# Patient Record
Sex: MALE | Race: WHITE | NOT HISPANIC OR LATINO | Employment: STUDENT | URBAN - METROPOLITAN AREA
[De-identification: names, ages, dates, MRNs, and addresses within clinical notes are randomized per-mention and may not be internally consistent; named-entity substitution may affect disease eponyms.]

---
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
## 2020-07-01 ENCOUNTER — TELEMEDICINE CONVERTED (OUTPATIENT)
Dept: GASTROENTEROLOGY | Facility: CLINIC | Age: 20
End: 2020-07-01
Attending: PHYSICIAN ASSISTANT

## 2020-08-13 ENCOUNTER — OFFICE VISIT CONVERTED (OUTPATIENT)
Dept: GASTROENTEROLOGY | Facility: CLINIC | Age: 20
End: 2020-08-13
Attending: NURSE PRACTITIONER

## 2020-08-13 ENCOUNTER — HOSPITAL ENCOUNTER (OUTPATIENT)
Dept: LAB | Facility: HOSPITAL | Age: 20
Discharge: HOME | End: 2020-08-13
Attending: NURSE PRACTITIONER

## 2020-08-13 LAB
ALBUMIN SERPL-MCNC: 4.7 G/DL (ref 3.5–5)
ALBUMIN/GLOB SERPL: 1.7 {RATIO} (ref 1.4–2.6)
ALP SERPL-CCNC: 94 U/L (ref 53–128)
ALT SERPL-CCNC: 11 U/L (ref 10–40)
ANION GAP SERPL CALC-SCNC: 23 MMOL/L (ref 8–19)
AST SERPL-CCNC: 14 U/L (ref 15–50)
BASOPHILS # BLD AUTO: 0.03 10*3/UL (ref 0–0.2)
BASOPHILS NFR BLD AUTO: 0.4 % (ref 0–3)
BILIRUB SERPL-MCNC: 0.47 MG/DL (ref 0.2–1.3)
BUN SERPL-MCNC: 10 MG/DL (ref 5–25)
BUN/CREAT SERPL: 9 {RATIO} (ref 6–20)
CALCIUM SERPL-MCNC: 10.7 MG/DL (ref 8.7–10.4)
CHLORIDE SERPL-SCNC: 101 MMOL/L (ref 99–111)
CONV ABS IMM GRAN: 0.02 10*3/UL (ref 0–0.2)
CONV CO2: 24 MMOL/L (ref 22–32)
CONV IMMATURE GRAN: 0.3 % (ref 0–1.8)
CONV TOTAL PROTEIN: 7.4 G/DL (ref 6.3–8.2)
CREAT UR-MCNC: 1.1 MG/DL (ref 0.7–1.2)
DEPRECATED RDW RBC AUTO: 35.8 FL (ref 35.1–43.9)
EOSINOPHIL # BLD AUTO: 0 % (ref 0–7)
EOSINOPHIL # BLD AUTO: 0 10*3/UL (ref 0–0.7)
ERYTHROCYTE [DISTWIDTH] IN BLOOD BY AUTOMATED COUNT: 11.2 % (ref 11.6–14.4)
GFR SERPLBLD BASED ON 1.73 SQ M-ARVRAT: >60 ML/MIN/{1.73_M2}
GLOBULIN UR ELPH-MCNC: 2.7 G/DL (ref 2–3.5)
GLUCOSE SERPL-MCNC: 85 MG/DL (ref 70–99)
HCT VFR BLD AUTO: 46.7 % (ref 42–52)
HGB BLD-MCNC: 16.6 G/DL (ref 14–18)
LYMPHOCYTES # BLD AUTO: 2.19 10*3/UL (ref 1–5)
LYMPHOCYTES NFR BLD AUTO: 32.7 % (ref 20–45)
MCH RBC QN AUTO: 31.3 PG (ref 27–31)
MCHC RBC AUTO-ENTMCNC: 35.5 G/DL (ref 33–37)
MCV RBC AUTO: 88.1 FL (ref 80–96)
MONOCYTES # BLD AUTO: 0.46 10*3/UL (ref 0.2–1.2)
MONOCYTES NFR BLD AUTO: 6.9 % (ref 3–10)
NEUTROPHILS # BLD AUTO: 4 10*3/UL (ref 2–8)
NEUTROPHILS NFR BLD AUTO: 59.7 % (ref 30–85)
NRBC CBCN: 0 % (ref 0–0.7)
OSMOLALITY SERPL CALC.SUM OF ELEC: 296 MOSM/KG (ref 273–304)
PLATELET # BLD AUTO: 234 10*3/UL (ref 130–400)
PMV BLD AUTO: 10.8 FL (ref 9.4–12.4)
POTASSIUM SERPL-SCNC: 4.1 MMOL/L (ref 3.5–5.3)
RBC # BLD AUTO: 5.3 10*6/UL (ref 4.7–6.1)
SODIUM SERPL-SCNC: 144 MMOL/L (ref 135–147)
WBC # BLD AUTO: 6.7 10*3/UL (ref 4.8–10.8)

## 2020-08-22 ENCOUNTER — HOSPITAL ENCOUNTER (OUTPATIENT)
Dept: PREADMISSION TESTING | Facility: HOSPITAL | Age: 20
Discharge: HOME | End: 2020-08-22
Attending: INTERNAL MEDICINE

## 2020-08-22 LAB — SARS-COV-2 RNA SPEC QL NAA+PROBE: NOT DETECTED

## 2020-08-27 ENCOUNTER — HOSPITAL ENCOUNTER (OUTPATIENT)
Dept: GASTROENTEROLOGY | Facility: HOSPITAL | Age: 20
Setting detail: HOSPITAL OUTPATIENT SURGERY
Discharge: HOME | End: 2020-08-27
Attending: INTERNAL MEDICINE

## 2020-11-30 ENCOUNTER — TELEMEDICINE CONVERTED (OUTPATIENT)
Dept: GASTROENTEROLOGY | Facility: CLINIC | Age: 20
End: 2020-11-30
Attending: NURSE PRACTITIONER

## 2021-05-13 NOTE — PROGRESS NOTES
Progress Note      Patient Name: Junaid Raman   Patient ID: [id number]   Sex: Male   YOB: 2000    Referring Provider: Colleen Valle MD    Visit Date: November 30, 2020    Provider: DELILAH Weathers   Location: [facility] [place]   Location Address: [address] [place] [address]  [place]  [id number]   Location Phone: [phone]          Chief Complaint  · Follow up of EGD/Colonoscopy      History Of Present Illness  Video Conferencing Visit  Junaid Raman is a 19 year old /White male who is presenting for evaluation via video conferencing via [employer]. Verbal consent obtained before beginning visit.   The following staff were present during this visit: Ana Warren MA      19-year-old male returns for follow-up after having EGD/colonoscopy 08/2020 by Dr. Vigil for epigastric pain, GERD, vomiting, generalized abdominal pain, and change in bowel habits.  The procedures revealed medium hiatal hernia with a gaping lower esophageal sphincter noted, grade 1 esophagitis, and normal mucosa in the whole colon.  Random colon biopsy was unremarkable.  His omeprazole was decreased from 40 mg to 20 mg after the procedure.  His reflux did improve, but it has come back. He reports that he had C. diff in high school, which was attributed to taking a PPI.   He does report having nausea after eating, but he does not vomit.  He has abdominal pain every few days, for which is controlled with dicyclomine as needed.  He reports he has 1 loose stool a day.         Past Medical History  Chronic Ear Infection         Past Surgical History  Colonoscopy; EGD         Medication List  citalopram 10 mg oral tablet; dicyclomine 20 mg oral tablet; Florastor 250 mg oral capsule; fluoxetine 20 mg oral capsule; omeprazole 40 mg oral capsule,delayed release(DR/EC)         Allergy List  NO KNOWN DRUG ALLERGIES       Allergies Reconciled  Family Medical History  - No Family History of Colorectal  Cancer         Social History  Alcohol (Never); Tobacco (Never)         Review of Systems  · Constitutional  o Denies  o : chills, fever  · Cardiovascular  o Denies  o : chest pain  · Respiratory  o Denies  o : shortness of breath  · Gastrointestinal  o Admits  o : nausea  o Denies  o : vomiting, dysphagia  · Endocrine  o Denies  o : weight gain, weight loss      Physical Examination  · Constitutional  o Appearance  o : Healthy-appearing, awake and alert in no acute distress  · Head and Face  o Head  o : Normocephalic with no worriesome skin lesions  · Eyes  o Vision  o :   § Visual Fields  § : eyes move symmetrical in all directions  o Sclerae  o : sclerae anicteric  · Neck  o Inspection/Palpation  o : Trachea is midline, no adenopathy  · Respiratory  o Respiratory Effort  o : Breathing is unlabored.  o Inspection of Chest  o : normal appearance          Assessment  · Esophageal Reflux     530.81/K21.9  · Hernia, Hiatal     553.3/K44.9  · Irritable Bowel Syndrome     564.1/K58.9  · Nausea     787.02/R11.0      Plan  · Medications  o famotidine 20 mg oral tablet   SIG: take 1 tablet (20 mg) by oral route once daily at bedtime for 30 days   DISP: (30) Tablet with 3 refills  Prescribed on 11/30/2020     o Medications have been Reconciled  o Transition of Care or Provider Policy  · Instructions  o 19-year-old male agrees to a telehealth visit. We have discussed the results of his EGD/colonoscopy from August 2020. In addition to his omeprazole 20 mg daily, I would to send in famotidine 20 mg nightly to see if that will help improve his symptoms. I have also discussed following a gastroparesis diet, as it may help improve his nausea after eating. I am going to have him follow-up in 3 months.            Electronically Signed by: DELILAH Weathers -Author on November 30, 2020 01:30:41 PM  Electronically Co-signed by: Daniel Vigil MD -Reviewer on December 16, 2020 05:28:17 PM

## 2021-05-13 NOTE — PROGRESS NOTES
Progress Note      Patient Name: Junaid Raman   Patient ID: [id number]   Sex: Male   YOB: 2000    Referring Provider: Colleen Valle MD    Visit Date: July 1, 2020    Provider: Reuben New PA-C   Location: [facility]   Location Address: [address] [place] [address]  [id number]   Location Phone: [phone]          History Of Present Illness  TELEHEALTH TELEPHONE VISIT  Chief Complaint: Bentyl refill   Junaid Raman is a 19 year old /White male who is presenting for evaluation via telehealth telephone visit. Verbal consent obtained before beginning visit.   Provider spent 11 minutes with the patient during the telehealth visit.   The following staff were present during this visit: Marylin Warren MA   Past Medical History/ Overview of Patient Symptoms      Past Medical History  Chronic Ear Infection         Past Surgical History  *No Pertinent Past Medical History         Medication List  dicyclomine 10 mg oral capsule; dicyclomine 20 mg oral tablet; Florastor 250 mg oral capsule; fluoxetine 20 mg oral capsule; omeprazole 40 mg oral capsule,delayed release(DR/EC)         Allergy List  NO KNOWN DRUG ALLERGIES       Allergies Reconciled  Family Medical History  - No Family History of Colorectal Cancer         Social History  *Denies Alcohol Use; Alcohol (Never); Tobacco (Never)         Review of Systems  · Constitutional  o Denies  o : fatigue, night sweats  · Eyes  o Denies  o : double vision, blurred vision  · HENT  o Denies  o : vertigo, recent head injury  · Breasts  o Denies  o : abnormal changes in breast size, additional breast symptoms except as noted in the HPI  · Cardiovascular  o Denies  o : chest pain, irregular heart beats  · Respiratory  o Denies  o : shortness of breath, productive cough  · Gastrointestinal  o Denies  o : nausea, vomiting  · Genitourinary  o Denies  o : dysuria, urinary retention  · Integument  o Denies  o : hair growth change, new skin  lesions  · Neurologic  o Denies  o : altered mental status, seizures  · Musculoskeletal  o Denies  o : joint swelling, limitation of motion  · Endocrine  o Denies  o : cold intolerance, heat intolerance  · Heme-Lymph  o Denies  o : petechiae, lymph node enlargement or tenderness  · Allergic-Immunologic  o Denies  o : frequent illnesses      Physical Examination     Sclera anicteric, eyebrows normal, hearing normal, trachea midline, no nasal deformities, no outer ear deformities, no lip deformities, patient in no acute distress, alert and oriented, breathing unlabored           Assessment  · IBS (irritable bowel syndrome)     564.1/K58.9      Plan  · Medications  o Medications have been Reconciled  o Transition of Care or Provider Policy  · Instructions  o Plan Of Care: 19-year-old male with IBS. We will refill his Bentyl. He will follow-up in 1 year or sooner for any concern.            Electronically Signed by: Reuben New PA-C -Author on July 1, 2020 12:58:43 PM  Electronically Co-signed by: Daniel Vigil MD -Reviewer on July 1, 2020 04:50:19 PM

## 2021-05-13 NOTE — PROGRESS NOTES
Progress Note      Patient Name: Junaid Raman   Patient ID: [id number]   Sex: Male   YOB: 2000    Referring Provider: Colleen Valle MD    Visit Date: August 13, 2020    Provider: DELILAH Weathers   Location: [facility]   Location Address: [address] [place] [address]  [place]  [id number]   Location Phone: [phone]          Chief Complaint  · IBS      History Of Present Illness     19-year-old male, with a history of IBS, presents today with complaints of vomiting.  He reports approximately 2 weekends ago, he went to a [employer] party and had approximately 4 drinks and became nauseated and started vomiting.  He reports that he had vomited black emesis, but he attributes that to possibly drinking a Dr. Pepper earlier that day.  He reports since that incident, he has had random incidences of puking for the rest of that week.  He reports he has had abdominal pain that is described as sharp and is located in the left lower quadrant and right lower quadrant region.  The pain has been present for approximately 2 weeks.  Taking Bentyl does help with the pain.  He has never had a colonoscopy.       Past Medical History  Chronic Ear Infection         Past Surgical History  *No Pertinent Past Medical History         Medication List  dicyclomine 20 mg oral tablet; Florastor 250 mg oral capsule; fluoxetine 20 mg oral capsule; omeprazole 40 mg oral capsule,delayed release(DR/EC)         Allergy List  NO KNOWN DRUG ALLERGIES       Allergies Reconciled  Family Medical History  - No Family History of Colorectal Cancer         Social History  Alcohol (Never); Tobacco (Never)         Review of Systems  · Constitutional  o Denies  o : chills, fever  · Cardiovascular  o Denies  o : chest pain  · Respiratory  o Denies  o : shortness of breath  · Gastrointestinal  o Denies  o : nausea, vomiting, dysphagia  · Endocrine  o Denies  o : weight gain, weight loss      Vitals  Date Time BP Position Site L\R  "Cuff Size HR RR TEMP (F) WT  HT  BMI kg/m2 BSA m2 O2 Sat HC       08/13/2020 08:07 /77 Sitting    78 - R 16  154lbs 0oz 6'  2\" 19.77 1.91 100 %          Physical Examination  · Constitutional  o Appearance  o : Healthy-appearing, awake and alert in no acute distress  · Head and Face  o Head  o : Normocephalic with no worriesome skin lesions  · Eyes  o Vision  o :   § Visual Fields  § : eyes move symmetrical in all directions  o Sclerae  o : sclerae anicteric  · Neck  o Inspection/Palpation  o : Trachea is midline, no adenopathy  · Respiratory  o Respiratory Effort  o : Breathing is unlabored.  o Inspection of Chest  o : normal appearance  o Auscultation of Lungs  o : Chest is clear to auscultation bilaterally.  · Cardiovascular  o Heart  o :   § Auscultation of Heart  § : no murmurs, rubs, or gallops, RRR  o Peripheral Vascular System  o :   § Extremities  § : no cyanosis, clubbing or edema;   · Gastrointestinal  o Abdominal Examination  o : Abdomen is soft, RLQ & LLQ tender to palpation, with normal active bowel sounds, no appreciable hepatosplenomegaly.          Assessment  · Abdominal Pain, RLQ     789.03/R10.31  · Abdominal Pain, LLQ     789.04/R10.32  · Irritable Bowel Syndrome     564.1/K58.9  · Nausea     787.02/R11.0  · Vomiting     787.03/R11.10    Problems Reconciled  Plan  · Orders  o Consent for Esophagogastrodudodenoscopy (EGD) with dilatation -Possible risk/complications, benefits, and alternatives to surgical or invasive procedure have been explained to patient and/or legal guardian. -Patient has been evaluated and can tolerate anesthesia and/or sedation. Risk, benefits, and alternatives to anesthesia and sedation have been explained to patient and/or legal guardian. ([id number]) - 787.02/R11.0, 787.03/R11.10 - 08/13/2020  o Flexible Colonoscopy -Possible risks/complications, benefits, and alternatives to surgical or invasive procedure have been explained to patient and/or legal gaurdian. -Patient " has been evaluated and can tolerate anethesia and/or sedation. Risk, benefits, and alternatives to anethesia and/or sedation have been explained to patient and/or legal gaurdian. ([id number]) - 789.03/R10.31, 789.04/R10.32, 564.1/K58.9 - 08/13/2020  o CMP [facility] ([id number]) - 789.03/R10.31, 789.04/R10.32, 787.02/R11.0, 787.03/R11.10 - 08/13/2020  o CBC with Auto Diff [facility] ([id number]) - 789.03/R10.31, 789.04/R10.32, 787.02/R11.0, 787.03/R11.10 - 08/13/2020  · Medications  o Medications have been Reconciled  o Transition of Care or Provider Policy  · Instructions  o 19-year-old male, with a history of IBS, presents today with complaints of vomiting and right lower quadrant and left lower quadrant pain. He believes that he may have had black emesis, but he attributes that to possibly drinking a Dr. Pepper earlier that day. I have recommended to take dicyclomine as needed for the abdominal pain. I have cautioned to him that it can cause drowsiness as a side effect. I have recommended doing both an EGD/colonoscopy for his symptoms. I have instructed to the patient that COVID testing would be required prior to procedure. Patient is agreeable plan.  o Electronically Identified Patient Education Materials Provided Electronically            Electronically Signed by: DELILAH Weathers -Author on August 13, 2020 08:28:42 AM  Electronically Co-signed by: Daniel Vigil MD -Reviewer on August 16, 2020 01:24:05 PM

## 2021-05-15 VITALS
BODY MASS INDEX: 19.76 KG/M2 | RESPIRATION RATE: 16 BRPM | SYSTOLIC BLOOD PRESSURE: 128 MMHG | OXYGEN SATURATION: 100 % | DIASTOLIC BLOOD PRESSURE: 77 MMHG | HEIGHT: 74 IN | HEART RATE: 78 BPM | WEIGHT: 154 LBS

## 2021-05-16 VITALS
SYSTOLIC BLOOD PRESSURE: 134 MMHG | DIASTOLIC BLOOD PRESSURE: 63 MMHG | RESPIRATION RATE: 12 BRPM | HEIGHT: 74 IN | WEIGHT: 156.56 LBS | BODY MASS INDEX: 20.09 KG/M2 | HEART RATE: 101 BPM

## 2021-09-07 ENCOUNTER — TELEMEDICINE (OUTPATIENT)
Dept: GASTROENTEROLOGY | Facility: CLINIC | Age: 21
End: 2021-09-07

## 2021-09-07 DIAGNOSIS — K44.9 HIATAL HERNIA: ICD-10-CM

## 2021-09-07 DIAGNOSIS — K21.00 GASTROESOPHAGEAL REFLUX DISEASE WITH ESOPHAGITIS WITHOUT HEMORRHAGE: Primary | ICD-10-CM

## 2021-09-07 DIAGNOSIS — K90.41 GLUTEN INTOLERANCE: ICD-10-CM

## 2021-09-07 PROCEDURE — 99213 OFFICE O/P EST LOW 20 MIN: CPT | Performed by: NURSE PRACTITIONER

## 2021-09-07 RX ORDER — CARIPRAZINE 1.5 MG/1
1 CAPSULE, GELATIN COATED ORAL DAILY
COMMUNITY
Start: 2021-08-11

## 2021-09-07 RX ORDER — OMEPRAZOLE 40 MG/1
40 CAPSULE, DELAYED RELEASE ORAL DAILY
Qty: 30 CAPSULE | Refills: 6 | Status: SHIPPED | OUTPATIENT
Start: 2021-09-07

## 2021-09-07 RX ORDER — DICYCLOMINE HYDROCHLORIDE 10 MG/1
1 CAPSULE ORAL DAILY PRN
COMMUNITY
End: 2021-09-07 | Stop reason: SDUPTHER

## 2021-09-07 RX ORDER — DICYCLOMINE HYDROCHLORIDE 10 MG/1
10 CAPSULE ORAL DAILY PRN
Qty: 90 CAPSULE | Refills: 1 | Status: SHIPPED | OUTPATIENT
Start: 2021-09-07

## 2021-09-07 RX ORDER — OMEPRAZOLE 40 MG/1
40 CAPSULE, DELAYED RELEASE ORAL DAILY
COMMUNITY
End: 2021-09-07 | Stop reason: SDUPTHER

## 2021-09-07 NOTE — PATIENT INSTRUCTIONS
Food Choices for Gastroesophageal Reflux Disease, Adult  When you have gastroesophageal reflux disease (GERD), the foods you eat and your eating habits are very important. Choosing the right foods can help ease your discomfort. Think about working with a food expert (dietitian) to help you make good choices.  What are tips for following this plan?  Reading food labels  · Read the label for foods that are low in saturated fat. Foods that may help with your symptoms include:  ? Foods that have less than 5% of daily value (DV) of fat.  ? Foods that have 0 grams of trans fat.  Cooking  · Do not sparks your food. Cook your food by baking, steaming, grilling, or broiling. These are all methods that do not need a lot of fat for cooking.  · To add flavor, try to use herbs that are low in spice and acidity.  Meal planning    · Choose healthy foods that are low in fat, such as:  ? Fruits and vegetables.  ? Whole grains.  ? Low-fat dairy products.  ? Lean meats, fish, and poultry.  · Eat small meals often instead of eating 3 large meals each day. Eat your meals slowly in a place where you are relaxed. Avoid bending over or lying down until 2-3 hours after eating.  · Limit high-fat foods such as fatty meats or fried foods.  · Limit your intake of oils, butter, and shortening to less than 8 teaspoons each day.  · Avoid the following:  ? Foods that cause symptoms. These may be different for different people. Keep a food diary to keep track of foods that cause symptoms.  ? Alcohol.  ? Drinking a lot of liquid with meals.  ? Eating meals during the 2-3 hours before bed.  Lifestyle  · Stay at a healthy weight. Ask your doctor what weight is healthy for you. If you need to lose weight, work with your doctor to do so safely.  · Exercise for at least 30 minutes on 5 or more days each week, or as told by your doctor.  · Wear loose-fitting clothes.  · Do not use any products that contain nicotine or tobacco, such as cigarettes,  e-cigarettes, and chewing tobacco. If you need help quitting, ask your doctor.  · Sleep with the head of your bed higher than your feet. Use a wedge under the mattress or blocks under the bed frame to raise the head of the bed.  What foods should eat?    Eat a healthy, well-balanced diet of fruits, vegetables, whole grains, low-fat dairy products, lean meats, fish, and poultry. Each person is different. Foods that may cause symptoms in one person may not cause any symptoms in another person. Work with your doctor to find foods that are safe for you.  The items listed above may not be a complete list of foods and beverages you can eat. Contact a dietitian for more information.  What foods should I avoid?  Limiting some of these foods may help in managing the symptoms of GERD. Everyone is different. Talk with a food expert or your doctor to help you find the exact foods to avoid, if any.  Fruits  Any fruits prepared with added fat. Any fruits that cause symptoms. For some people, this may include citrus fruits, such as oranges, grapefruit, pineapple, and rebecca.  Vegetables  Deep-fried vegetables. French fries. Any vegetables prepared with added fat. Any vegetables that cause symptoms. For some people, this may include tomatoes and tomato products, chili peppers, onions and garlic, and horseradish.  Grains  Pastries or quick breads with added fat.  Meats and other proteins  High-fat meats, such as fatty beef or pork, hot dogs, ribs, ham, sausage, salami, and dubois. Fried meat or protein, including fried fish and fried chicken. Nuts and nut butters.  Dairy  Whole milk and chocolate milk. Sour cream. Cream. Ice cream. Cream cheese. Milkshakes.  Fats and oils  Butter. Margarine. Shortening. Ghee.  Beverages  Coffee and tea, with or without caffeine. Carbonated beverages. Sodas. Energy drinks. Fruit juice made with acidic fruits (such as orange or grapefruit). Tomato juice. Alcoholic drinks.  Sweets and  desserts  Chocolate and cocoa. Donuts.  Seasonings and condiments  Pepper. Peppermint and spearmint. Added salt. Any condiments, herbs, or seasonings that cause symptoms. For some people, this may include dixon, hot sauce, or vinegar-based salad dressings.  The items listed above may not be a complete list of foods and beverages you should avoid. Contact a dietitian for more information.  Questions to ask your doctor  Diet and lifestyle changes are often the first steps that are taken to manage symptoms of GERD. If diet and lifestyle changes do not help, talk with your doctor about taking medicines.  Where to find more information  · International Foundation for Gastrointestinal Disorders: aboutgerd.org  Summary  · When you have GERD, food and lifestyle choices are very important in easing your symptoms.  · Eat small meals often instead of 3 large meals a day. Eat your meals slowly and in a place where you are relaxed.  · Avoid bending over or lying down until 2-3 hours after eating.  · Limit high-fat foods such as fatty meat or fried foods.  This information is not intended to replace advice given to you by your health care provider. Make sure you discuss any questions you have with your health care provider.  Document Revised: 10/12/2020 Document Reviewed: 10/12/2020  Elsevier Patient Education © 2021 [employer]

## 2021-09-07 NOTE — PROGRESS NOTES
Chief Complaint  Follow-up (6 months )    History of Present Illness  Junaid Raman is a 20 y.o. male who presents to [facility] for follow-up     You have chosen to receive care through a telephone visit. Do you consent to use a telephone visit for your medical care today? Yes  Informed patient that as visit is being performed as a telehealth visit there will be no opportunity to obtain vital signs or perform physical exam.  Due to this there is unfortunately a possibility that things may be missed that would typically be noticed during a traditionally visit.  Patient is aware of this possibility and agrees to proceed with telehealth.  Patient states there is no other person present for this phone call    20-year-old male presenting the office today for a 6-month follow-up with a history of reflux, esophagitis, hiatal hernia and gluten intolerance.  Patient reports that he cut gluten out about 6 months ago and he is feeling great.  He continues on omeprazole 40 mg daily with good control of his reflux.  Patient denies fever, nausea, vomiting, weight loss, night sweats, melena, hematochezia, hematemesis.    Endoscopy: Review of the patient's most recent EGD and colonoscopy performed by Dr. Vigil on 8/2020 revealed a medium size hiatal hernia with gaping lower esophageal sphincter noted grade 1 esophagitis and normal mucosa in the whole colon.  Random colon biopsy normal.  Past Medical History:   Diagnosis Date   • Chronic ear infection        Past Surgical History:   Procedure Laterality Date   • COLONOSCOPY  2020   • ENDOSCOPY  2020         Current Outpatient Medications:   •  dicyclomine (BENTYL) 10 MG capsule, Take 1 capsule by mouth Daily As Needed (Abdominal pain)., Disp: 90 capsule, Rfl: 1  •  omeprazole (priLOSEC) 40 MG capsule, Take 1 capsule by mouth Daily., Disp: 30 capsule, Rfl: 6  •  Vraylar 1.5 MG capsule capsule, Take 1 capsule by mouth Daily., Disp: , Rfl:      No  Known Allergies    Family History   Family history unknown: Yes        Social History     Social History Narrative   • Not on file       Objective         Vital Signs:   There were no vitals taken for this visit.      Physical Exam  Gen: well-nourished, no acute distress  HENT: atraumatic, normocephalic  Eyes: extraocular movements intact, no scleral icterus  Lung: breathing comfortably, no cough  Skin: no visible rash, no lesions  Neuro: grossly oriented to person, place, and time. no facial droop   Psych: normal mood and affect    Result Review :                  Assessment and Plan    Diagnoses and all orders for this visit:    1. Gastroesophageal reflux disease with esophagitis without hemorrhage (Primary)    2. Hiatal hernia    3. Gluten intolerance    Other orders  -     omeprazole (priLOSEC) 40 MG capsule; Take 1 capsule by mouth Daily.  Dispense: 30 capsule; Refill: 6  -     dicyclomine (BENTYL) 10 MG capsule; Take 1 capsule by mouth Daily As Needed (Abdominal pain).  Dispense: 90 capsule; Refill: 1      20-year-old male presenting the office via video visit today for a 6-month follow-up with a history of reflux, esophagitis, hiatal hernia and gluten intolerance.  Patient continues on omeprazole daily 40 mg.  He is doing well with this medication and has cut out gluten and reports resolution of his symptoms.  I will therefore refill his omeprazole.  Patient will follow up in the office in 6 months.  Patient to call with any questions or concerns.  Patient agreeable to this plan.          Follow Up   Return in about 6 months (around 3/7/2022).  Patient was given instructions and counseling regarding his condition or for health maintenance advice. Please see specific information pulled into the AVS if appropriate.